# Patient Record
Sex: FEMALE | Race: ASIAN | NOT HISPANIC OR LATINO
[De-identification: names, ages, dates, MRNs, and addresses within clinical notes are randomized per-mention and may not be internally consistent; named-entity substitution may affect disease eponyms.]

---

## 2017-01-04 ENCOUNTER — APPOINTMENT (OUTPATIENT)
Dept: CARDIOLOGY | Facility: CLINIC | Age: 67
End: 2017-01-04
Payer: MEDICARE

## 2017-01-04 ENCOUNTER — NON-APPOINTMENT (OUTPATIENT)
Age: 67
End: 2017-01-04

## 2017-01-04 VITALS
WEIGHT: 108 LBS | HEART RATE: 82 BPM | BODY MASS INDEX: 19.14 KG/M2 | SYSTOLIC BLOOD PRESSURE: 126 MMHG | DIASTOLIC BLOOD PRESSURE: 76 MMHG | OXYGEN SATURATION: 95 % | RESPIRATION RATE: 18 BRPM | HEIGHT: 63 IN

## 2017-01-04 DIAGNOSIS — Z00.00 ENCOUNTER FOR GENERAL ADULT MEDICAL EXAMINATION W/OUT ABNORMAL FINDINGS: ICD-10-CM

## 2017-01-04 DIAGNOSIS — Z78.9 OTHER SPECIFIED HEALTH STATUS: ICD-10-CM

## 2017-01-04 PROCEDURE — 99215 OFFICE O/P EST HI 40 MIN: CPT

## 2017-01-04 PROCEDURE — 93000 ELECTROCARDIOGRAM COMPLETE: CPT

## 2017-01-09 ENCOUNTER — APPOINTMENT (OUTPATIENT)
Dept: CARDIOLOGY | Facility: CLINIC | Age: 67
End: 2017-01-09

## 2017-01-09 ENCOUNTER — FORM ENCOUNTER (OUTPATIENT)
Age: 67
End: 2017-01-09

## 2017-01-10 ENCOUNTER — OUTPATIENT (OUTPATIENT)
Dept: OUTPATIENT SERVICES | Facility: HOSPITAL | Age: 67
LOS: 1 days | End: 2017-01-10
Payer: MEDICARE

## 2017-01-10 ENCOUNTER — APPOINTMENT (OUTPATIENT)
Dept: ULTRASOUND IMAGING | Facility: HOSPITAL | Age: 67
End: 2017-01-10

## 2017-01-10 DIAGNOSIS — I10 ESSENTIAL (PRIMARY) HYPERTENSION: ICD-10-CM

## 2017-01-10 DIAGNOSIS — Z00.00 ENCOUNTER FOR GENERAL ADULT MEDICAL EXAMINATION WITHOUT ABNORMAL FINDINGS: ICD-10-CM

## 2017-01-10 DIAGNOSIS — E78.5 HYPERLIPIDEMIA, UNSPECIFIED: ICD-10-CM

## 2017-01-10 DIAGNOSIS — I25.10 ATHEROSCLEROTIC HEART DISEASE OF NATIVE CORONARY ARTERY WITHOUT ANGINA PECTORIS: ICD-10-CM

## 2017-01-10 PROCEDURE — 93880 EXTRACRANIAL BILAT STUDY: CPT

## 2017-03-06 ENCOUNTER — RX RENEWAL (OUTPATIENT)
Age: 67
End: 2017-03-06

## 2017-09-06 ENCOUNTER — APPOINTMENT (OUTPATIENT)
Dept: OTOLARYNGOLOGY | Facility: CLINIC | Age: 67
End: 2017-09-06
Payer: MEDICARE

## 2017-09-06 VITALS
HEART RATE: 62 BPM | SYSTOLIC BLOOD PRESSURE: 112 MMHG | HEIGHT: 63 IN | DIASTOLIC BLOOD PRESSURE: 72 MMHG | WEIGHT: 107 LBS | BODY MASS INDEX: 18.96 KG/M2

## 2017-09-06 DIAGNOSIS — H93.11 TINNITUS, RIGHT EAR: ICD-10-CM

## 2017-09-06 PROCEDURE — 92567 TYMPANOMETRY: CPT

## 2017-09-06 PROCEDURE — 92557 COMPREHENSIVE HEARING TEST: CPT

## 2017-09-06 PROCEDURE — 31231 NASAL ENDOSCOPY DX: CPT

## 2017-09-06 PROCEDURE — 99204 OFFICE O/P NEW MOD 45 MIN: CPT | Mod: 25

## 2017-09-06 RX ORDER — AMOXICILLIN 500 MG/1
500 CAPSULE ORAL
Qty: 28 | Refills: 0 | Status: DISCONTINUED | COMMUNITY
Start: 2017-04-27

## 2017-09-06 RX ORDER — CLOTRIMAZOLE AND BETAMETHASONE DIPROPIONATE 10; .5 MG/G; MG/G
1-0.05 CREAM TOPICAL
Qty: 15 | Refills: 0 | Status: ACTIVE | COMMUNITY
Start: 2017-08-22

## 2017-09-06 RX ORDER — GLUCOSAMINE SULFATE 500 MG
CAPSULE ORAL
Refills: 0 | Status: ACTIVE | COMMUNITY

## 2017-09-11 ENCOUNTER — FORM ENCOUNTER (OUTPATIENT)
Age: 67
End: 2017-09-11

## 2017-09-12 ENCOUNTER — OUTPATIENT (OUTPATIENT)
Dept: OUTPATIENT SERVICES | Facility: HOSPITAL | Age: 67
LOS: 1 days | End: 2017-09-12
Payer: MEDICARE

## 2017-09-12 ENCOUNTER — APPOINTMENT (OUTPATIENT)
Dept: MRI IMAGING | Facility: CLINIC | Age: 67
End: 2017-09-12
Payer: MEDICARE

## 2017-09-12 DIAGNOSIS — Z00.8 ENCOUNTER FOR OTHER GENERAL EXAMINATION: ICD-10-CM

## 2017-09-12 PROCEDURE — 70553 MRI BRAIN STEM W/O & W/DYE: CPT | Mod: 26

## 2017-09-12 PROCEDURE — A9585: CPT

## 2017-09-12 PROCEDURE — 70553 MRI BRAIN STEM W/O & W/DYE: CPT

## 2017-09-12 PROCEDURE — 82565 ASSAY OF CREATININE: CPT

## 2018-09-26 ENCOUNTER — RESULT REVIEW (OUTPATIENT)
Age: 68
End: 2018-09-26

## 2019-03-25 ENCOUNTER — APPOINTMENT (OUTPATIENT)
Dept: ORTHOPEDIC SURGERY | Facility: CLINIC | Age: 69
End: 2019-03-25
Payer: MEDICARE

## 2019-03-25 DIAGNOSIS — M75.41 IMPINGEMENT SYNDROME OF RIGHT SHOULDER: ICD-10-CM

## 2019-03-25 PROCEDURE — 99203 OFFICE O/P NEW LOW 30 MIN: CPT

## 2019-03-25 PROCEDURE — 73030 X-RAY EXAM OF SHOULDER: CPT | Mod: RT

## 2019-03-25 NOTE — END OF VISIT
[FreeTextEntry3] : All medical record entries made by the Maranda[facility] were at my, Dr. Roman Castillo, direction and personally dictated by me on 03/25/2019. I have reviewed the chart and agree that the record accurately reflects my personal performance of the history, physical exam, assessment and plan. I have also personally directed, reviewed, and agreed with the chart.

## 2019-03-25 NOTE — ADDENDUM
[FreeTextEntry1] : I, Vanessa Benavides, acted solely as a scribe for Dr. Roman Castillo on this date 03/25/2019.

## 2019-03-25 NOTE — CONSULT LETTER
[Dear  ___] : Dear  [unfilled], [Consult Letter:] : I had the pleasure of evaluating your patient, [unfilled]. [Please see my note below.] : Please see my note below. [Consult Closing:] : Thank you very much for allowing me to participate in the care of this patient.  If you have any questions, please do not hesitate to contact me. [Sincerely,] : Sincerely, [FreeTextEntry3] :  Dr. Roman Castillo

## 2019-03-25 NOTE — PHYSICAL EXAM
[Normal RUE] : Right Upper Extremity: No scars, rashes, lesions, ulcers, skin intact [Normal LUE] : Left Upper Extremity: No scars, rashes, lesions, ulcers, skin intact [Normal Touch] : sensation intact for touch [Normal] : No swelling, no edema, normal pedal pulses and normal temperature [Poor Appearance] : well-appearing [Acute Distress] : not in acute distress [Obese] : not obese [de-identified] : Right Upper Extremity\par o Shoulder :\par ¦ Inspection/Palpation : tenderness over the greater tuberosity, no acromioclavicular joint tenderness, mild swelling of the shoulder, no deformities\par ¦ Range of Motion : ACTIVE FORWARD ELEVATION: Measured at 140 degrees, ACTIVE EXTERNAL ROTATION: Measured at 60 degrees, ACTIVE INTERNAL ROTATION: Measured at T10 \par ¦ Strength : external rotation 5/5, internal rotation 5/5, supraspinatus 5/5 \par ¦ Stability : no joint instability on provocative testing\par ¦ Tests/Signs : Neer (-), Ramirez (+)\par o Upper Arm : no tenderness, no swelling, no deformities\par o Muscle Bulk : no atrophy \par o Sensation : sensation intact to light touch \par o Skin : no skin rash or discoloration \par o Vascular Exam : no edema, no cyanosis, radial and ulnar pulses normal \par  \par Left Upper Extremity\par o Shoulder : \par ¦ Inspection/Palpation : no tenderness, no swelling, no deformities\par ¦ Range of Motion : ACTIVE FORWARD ELEVATION: Measured at 150 degrees, ACTIVE EXTERNAL ROTATION: Measured at 65 degrees, ACTIVE INTERNAL ROTATION: Measured at T7\par ¦ Strength : external rotation 5/5, internal rotation 5/5, supraspinatus 5/5 \par ¦ Stability : no joint instability on provocative testing\par o Upper Arm : no tenderness, no swelling, no deformities\par o Muscle Bulk : no atrophy\par o Sensation : sensation intact to light touch\par o Skin : no skin rash or discoloration\par o Vascular Exam : no edema, no cyanosis, radial and ulnar pulses normal  [de-identified] : o  Right Shoulder : Internal/External rotation, and outlet views were obtained, there are no soft tissue abnormalities, no fractures, alignment is normal, normal appearing joint spaces, cystic changed to the greater tuberosity, normal bone density, no bony lesions.

## 2019-03-25 NOTE — DISCUSSION/SUMMARY
[de-identified] : The underlying pathophysiology was reviewed in great detail with the patient as well as the various treatment options, including ice, analgesics, NSAIDs, Physical therapy, steroid injections.\par \par A home exercise sheet was given and discussed with the patient to follow, a Thera-Band was provided.\par \par Activity modifications and restrictions were discussed, as well as, working on posture. \par \par If her pain does not improve we will consider an MRI of the right shoulder.\par \par FU 4-6 weeks.

## 2019-03-25 NOTE — HISTORY OF PRESENT ILLNESS
[de-identified] : 68 year old female presents for an evaluation of right shoulder pain that began in December 2018, she cannot attribute her pain to any specific injury or event. Today she is accompanied to the office by her  who use to work as an orthopedist, he is helping contribute to her medical history. She says that her pain has been progressively worsening over time and currently she rates her pain a 6/10 and describes it as a constant aching pain localized to the anterior aspect of her shoulder. Her pain is exacerbated with shoulder rotation, especially abduction and internal rotation. She reports that her pain has been waking her at night and she is unable to sleep on her right side. Her pain is alleviated with limiting use of her right arm, she has not been taking any medications for her pain at this time.

## 2020-10-09 ENCOUNTER — RESULT REVIEW (OUTPATIENT)
Age: 70
End: 2020-10-09

## 2022-10-13 ENCOUNTER — RESULT REVIEW (OUTPATIENT)
Age: 72
End: 2022-10-13

## 2023-04-11 ENCOUNTER — OFFICE (OUTPATIENT)
Dept: URBAN - METROPOLITAN AREA CLINIC 35 | Facility: CLINIC | Age: 73
Setting detail: OPHTHALMOLOGY
End: 2023-04-11
Payer: MEDICARE

## 2023-04-11 DIAGNOSIS — H25.13: ICD-10-CM

## 2023-04-11 DIAGNOSIS — H11.153: ICD-10-CM

## 2023-04-11 PROCEDURE — 99214 OFFICE O/P EST MOD 30 MIN: CPT | Performed by: OPHTHALMOLOGY

## 2023-04-11 ASSESSMENT — REFRACTION_CURRENTRX
OD_ADD: +1.50
OS_SPHERE: +0.25
OS_AXIS: 163
OD_VPRISM_DIRECTION: SV
OS_CYLINDER: +1.25
OS_OVR_VA: 20/
OD_VPRISM_DIRECTION: BF
OS_VPRISM_DIRECTION: BF
OS_SPHERE: -0.25
OS_SPHERE: -0.25
OD_OVR_VA: 20/
OS_OVR_VA: 20/
OD_OVR_VA: 20/
OS_ADD: +1.50
OD_SPHERE: -1.75
OD_CYLINDER: +1.25
OD_OVR_VA: 20/
OS_AXIS: 169
OD_CYLINDER: +0.75
OD_AXIS: 002
OD_AXIS: 005
OD_SPHERE: -1.75
OS_AXIS: 169
OD_SPHERE: +0.50
OS_OVR_VA: 20/
OD_AXIS: 011
OD_CYLINDER: +1.00
OS_CYLINDER: +1.25
OS_CYLINDER: +1.50

## 2023-04-11 ASSESSMENT — VISUAL ACUITY
OS_BCVA: 20/70
OD_BCVA: 20/50

## 2023-04-11 ASSESSMENT — REFRACTION_MANIFEST
OS_ADD: +2.50
OS_AXIS: 165
OS_CYLINDER: +1.50
OD_CYLINDER: +1.00
OS_VA1: 20/20
OS_ADD: +2.25
OD_ADD: +2.50
OS_ADD: +1.50
OS_CYLINDER: +0.50
OD_SPHERE: -1.50
OS_SPHERE: +0.25
OD_AXIS: 180
OS_SPHERE: PLANO
OD_SPHERE: -1.25
OS_AXIS: 170
OD_AXIS: 180
OS_CYLINDER: +1.50
OD_ADD: +2.25
OD_CYLINDER: +1.25
OD_VA1: 20/20
OD_CYLINDER: +1.25
OD_VA1: 20/30-
OS_AXIS: 170
OS_AXIS: 170
OD_ADD: +1.50
OS_ADD: +2.50
OS_SPHERE: PLANO
OD_AXIS: 180
OS_SPHERE: +0.50
OD_VA1: 20/20
OD_AXIS: 180
OS_CYLINDER: +1.50
OD_CYLINDER: +1.00
OD_SPHERE: -1.75
OD_VA1: 20/25
OD_SPHERE: -1.75
OD_ADD: +2.50
OS_VA1: 20/30-

## 2023-04-11 ASSESSMENT — REFRACTION_AUTOREFRACTION
OD_AXIS: 159
OD_CYLINDER: +1.25
OS_SPHERE: +1.25
OS_AXIS: 173
OS_CYLINDER: +1.25
OD_SPHERE: -1.25

## 2023-04-11 ASSESSMENT — AXIALLENGTH_DERIVED
OS_AL: 23.3372
OD_AL: 24.328
OS_AL: 23.4329
OD_AL: 24.1226
OD_AL: 24.1736
OD_AL: 24.2763
OS_AL: 23.1014
OD_AL: 24.328

## 2023-04-11 ASSESSMENT — SPHEQUIV_DERIVED
OD_SPHEQUIV: -0.625
OS_SPHEQUIV: 1.875
OD_SPHEQUIV: -1.125
OD_SPHEQUIV: -0.75
OS_SPHEQUIV: 1
OD_SPHEQUIV: -1.125
OS_SPHEQUIV: 1.25
OD_SPHEQUIV: -1

## 2023-04-11 ASSESSMENT — KERATOMETRY
OS_K2POWER_DIOPTERS: 43.25
METHOD_AUTO_MANUAL: AUTO
OS_K1POWER_DIOPTERS: 42.50
OD_K1POWER_DIOPTERS: 42.50
OD_AXISANGLE_DEGREES: 009
OS_AXISANGLE_DEGREES: 157
OD_K2POWER_DIOPTERS: 43.00

## 2023-04-11 ASSESSMENT — LID EXAM ASSESSMENTS
OD_COMMENTS: BLEPHAROCHALASIS WITH HOODING
OS_COMMENTS: BLEPHAROCHALASIS WITH HOODING

## 2023-04-15 ENCOUNTER — OFFICE (OUTPATIENT)
Dept: URBAN - METROPOLITAN AREA CLINIC 35 | Facility: CLINIC | Age: 73
Setting detail: OPHTHALMOLOGY
End: 2023-04-15
Payer: MEDICARE

## 2023-04-15 DIAGNOSIS — H35.033: ICD-10-CM

## 2023-04-15 DIAGNOSIS — H16.222: ICD-10-CM

## 2023-04-15 DIAGNOSIS — H35.40: ICD-10-CM

## 2023-04-15 DIAGNOSIS — H25.13: ICD-10-CM

## 2023-04-15 DIAGNOSIS — H01.001: ICD-10-CM

## 2023-04-15 DIAGNOSIS — H01.004: ICD-10-CM

## 2023-04-15 DIAGNOSIS — H40.013: ICD-10-CM

## 2023-04-15 DIAGNOSIS — H11.153: ICD-10-CM

## 2023-04-15 PROCEDURE — 92014 COMPRE OPH EXAM EST PT 1/>: CPT | Performed by: OPHTHALMOLOGY

## 2023-04-15 ASSESSMENT — REFRACTION_MANIFEST
OD_SPHERE: -1.75
OD_CYLINDER: +1.00
OD_SPHERE: -1.50
OS_CYLINDER: +1.50
OS_ADD: +2.25
OS_ADD: +2.50
OS_AXIS: 170
OD_ADD: +1.50
OD_VA1: 20/30-
OD_ADD: +2.50
OD_AXIS: 180
OD_SPHERE: -1.25
OS_ADD: +1.50
OS_CYLINDER: +1.00
OD_AXIS: 180
OS_AXIS: 165
OS_AXIS: 180
OD_VA1: 20/20
OD_AXIS: 180
OS_CYLINDER: +0.50
OS_SPHERE: +0.50
OS_CYLINDER: +1.50
OS_SPHERE: +0.75
OD_VA1: 20/30-2
OD_CYLINDER: +1.25
OD_CYLINDER: +1.25
OS_AXIS: 170
OS_VA1: 20/30-
OD_VA1: 20/25
OD_SPHERE: -1.50
OD_ADD: +2.25
OS_CYLINDER: +1.50
OS_SPHERE: PLANO
OD_CYLINDER: +1.25
OD_AXIS: 180
OS_SPHERE: PLANO
OS_AXIS: 170
OD_CYLINDER: +1.00
OS_VA1: 20/20
OD_SPHERE: -1.75
OS_ADD: +2.50
OS_SPHERE: +0.25
OD_VA1: 20/20
OS_VA1: 20/20-2
OD_ADD: +2.50
OD_AXIS: 160

## 2023-04-15 ASSESSMENT — KERATOMETRY
OD_K1POWER_DIOPTERS: 42.25
OD_AXISANGLE_DEGREES: 010
OD_K2POWER_DIOPTERS: 043.00
OS_K1POWER_DIOPTERS: 42.50
METHOD_AUTO_MANUAL: AUTO
OS_K2POWER_DIOPTERS: 43.25
OS_AXISANGLE_DEGREES: 158

## 2023-04-15 ASSESSMENT — AXIALLENGTH_DERIVED
OD_AL: 24.325
OS_AL: 23.3372
OD_AL: 24.2733
OS_AL: 23.4329
OS_AL: 23.3372
OS_AL: 23.1482
OD_AL: 24.3769
OD_AL: 24.3769
OD_AL: 24.2219
OD_AL: 24.2219

## 2023-04-15 ASSESSMENT — SPHEQUIV_DERIVED
OS_SPHEQUIV: 1.25
OD_SPHEQUIV: -1.125
OD_SPHEQUIV: -0.75
OD_SPHEQUIV: -0.75
OS_SPHEQUIV: 1.25
OS_SPHEQUIV: 1
OD_SPHEQUIV: -0.875
OD_SPHEQUIV: -1.125
OD_SPHEQUIV: -1
OS_SPHEQUIV: 1.75

## 2023-04-15 ASSESSMENT — REFRACTION_CURRENTRX
OD_AXIS: 011
OD_CYLINDER: +0.75
OS_AXIS: 169
OD_VPRISM_DIRECTION: BF
OD_SPHERE: +0.50
OS_AXIS: 169
OD_ADD: +1.50
OS_VPRISM_DIRECTION: BF
OS_ADD: +1.50
OD_OVR_VA: 20/
OD_CYLINDER: +1.25
OD_AXIS: 002
OS_SPHERE: +0.25
OS_CYLINDER: +1.25
OS_SPHERE: -0.25
OD_AXIS: 005
OD_SPHERE: -1.75
OS_SPHERE: -0.25
OS_OVR_VA: 20/
OS_OVR_VA: 20/
OD_VPRISM_DIRECTION: SV
OD_SPHERE: -1.75
OS_CYLINDER: +1.50
OD_OVR_VA: 20/
OD_OVR_VA: 20/
OD_CYLINDER: +1.00
OS_OVR_VA: 20/
OS_AXIS: 163
OS_CYLINDER: +1.25

## 2023-04-15 ASSESSMENT — LID EXAM ASSESSMENTS
OD_COMMENTS: BLEPHAROCHALASIS WITH HOODING
OS_BLEPHARITIS: LUL T
OS_COMMENTS: BLEPHAROCHALASIS WITH HOODING
OD_BLEPHARITIS: RUL T 1+

## 2023-04-15 ASSESSMENT — REFRACTION_AUTOREFRACTION
OS_CYLINDER: +1.00
OS_SPHERE: +1.25
OD_CYLINDER: +1.00
OD_SPHERE: -1.25
OS_AXIS: 177
OD_AXIS: 159

## 2023-04-15 ASSESSMENT — VISUAL ACUITY
OS_BCVA: 20/60
OD_BCVA: 20/40+1

## 2023-04-15 ASSESSMENT — CONFRONTATIONAL VISUAL FIELD TEST (CVF)
OD_FINDINGS: FULL
OS_FINDINGS: FULL

## 2023-04-15 ASSESSMENT — TONOMETRY
OS_IOP_MMHG: 18
OD_IOP_MMHG: 20

## 2023-04-15 ASSESSMENT — DRY EYES - PHYSICIAN NOTES: OS_GENERALCOMMENTS: TEAR FILM DEBRIS, SPK IN

## 2023-04-15 ASSESSMENT — SUPERFICIAL PUNCTATE KERATITIS (SPK): OS_SPK: 1+ 2+

## 2023-06-06 ENCOUNTER — OFFICE (OUTPATIENT)
Dept: URBAN - METROPOLITAN AREA CLINIC 34 | Facility: CLINIC | Age: 73
Setting detail: OPHTHALMOLOGY
End: 2023-06-06
Payer: MEDICARE

## 2023-06-06 DIAGNOSIS — H25.13: ICD-10-CM

## 2023-06-06 DIAGNOSIS — H25.11: ICD-10-CM

## 2023-06-06 PROCEDURE — 92136 OPHTHALMIC BIOMETRY: CPT | Performed by: OPHTHALMOLOGY

## 2023-06-06 PROCEDURE — 99213 OFFICE O/P EST LOW 20 MIN: CPT | Performed by: OPHTHALMOLOGY

## 2023-06-06 ASSESSMENT — REFRACTION_MANIFEST
OS_ADD: +2.50
OD_ADD: +2.50
OD_VA1: 20/25
OS_SPHERE: PLANO
OS_ADD: +2.25
OS_AXIS: 170
OD_CYLINDER: +1.00
OS_SPHERE: PLANO
OD_SPHERE: -1.25
OD_SPHERE: -1.75
OS_VA1: 20/30-
OD_CYLINDER: +1.25
OS_AXIS: 180
OS_CYLINDER: +1.50
OS_SPHERE: +0.75
OD_ADD: +1.50
OS_VA1: 20/20
OS_VA1: 20/20-2
OS_CYLINDER: +1.50
OD_SPHERE: -1.50
OD_CYLINDER: +1.25
OD_CYLINDER: +1.00
OD_SPHERE: -1.75
OS_SPHERE: +0.25
OS_AXIS: 165
OS_ADD: +2.50
OS_ADD: +1.50
OD_VA1: 20/30-
OD_ADD: +2.50
OD_AXIS: 160
OD_AXIS: 180
OD_SPHERE: -1.50
OD_ADD: +2.25
OS_AXIS: 170
OS_CYLINDER: +0.50
OD_VA1: 20/20
OS_SPHERE: +0.50
OS_CYLINDER: +1.00
OD_AXIS: 180
OS_AXIS: 170
OD_VA1: 20/20
OD_CYLINDER: +1.25
OD_AXIS: 180
OS_CYLINDER: +1.50
OD_AXIS: 180
OD_VA1: 20/30-2

## 2023-06-06 ASSESSMENT — CONFRONTATIONAL VISUAL FIELD TEST (CVF)
OS_FINDINGS: FULL
OD_FINDINGS: FULL

## 2023-06-06 ASSESSMENT — AXIALLENGTH_DERIVED
OS_AL: 23.3427
OD_AL: 24.0327
OD_AL: 23.9823
OD_AL: 24.0834
OD_AL: 24.1342
OS_AL: 23.2477
OS_AL: 23.2477
OD_AL: 24.0327
OD_AL: 24.1342
OS_AL: 23.0601

## 2023-06-06 ASSESSMENT — REFRACTION_AUTOREFRACTION
OD_SPHERE: -1.50
OD_AXIS: 161
OS_CYLINDER: +1.50
OD_CYLINDER: +1.25
OS_SPHERE: +1.00
OS_AXIS: 176

## 2023-06-06 ASSESSMENT — REFRACTION_CURRENTRX
OD_SPHERE: -1.75
OS_AXIS: 169
OD_VPRISM_DIRECTION: SV
OD_VPRISM_DIRECTION: BF
OD_AXIS: 005
OS_SPHERE: +0.25
OD_OVR_VA: 20/
OS_SPHERE: -0.25
OD_CYLINDER: +0.75
OS_OVR_VA: 20/
OS_SPHERE: -0.25
OD_AXIS: 011
OS_CYLINDER: +1.25
OD_OVR_VA: 20/
OD_CYLINDER: +1.25
OS_AXIS: 169
OD_OVR_VA: 20/
OD_CYLINDER: +1.00
OD_SPHERE: -1.75
OD_SPHERE: +0.50
OD_ADD: +1.50
OS_AXIS: 163
OS_CYLINDER: +1.25
OS_OVR_VA: 20/
OD_AXIS: 002
OS_VPRISM_DIRECTION: BF
OS_OVR_VA: 20/
OS_ADD: +1.50
OS_CYLINDER: +1.50

## 2023-06-06 ASSESSMENT — VISUAL ACUITY
OS_BCVA: 20/60
OD_BCVA: 20/40+1

## 2023-06-06 ASSESSMENT — SPHEQUIV_DERIVED
OD_SPHEQUIV: -1.125
OS_SPHEQUIV: 1.25
OD_SPHEQUIV: -0.75
OD_SPHEQUIV: -0.875
OD_SPHEQUIV: -0.875
OD_SPHEQUIV: -1
OD_SPHEQUIV: -1.125
OS_SPHEQUIV: 1
OS_SPHEQUIV: 1.25
OS_SPHEQUIV: 1.75

## 2023-06-06 ASSESSMENT — KERATOMETRY
OD_AXISANGLE_DEGREES: 090
OS_K2POWER_DIOPTERS: 43.50
OD_K2POWER_DIOPTERS: 43.25
OS_K1POWER_DIOPTERS: 42.75
OD_K1POWER_DIOPTERS: 43.25
OS_AXISANGLE_DEGREES: 159
METHOD_AUTO_MANUAL: AUTO

## 2023-06-06 ASSESSMENT — DRY EYES - PHYSICIAN NOTES: OS_GENERALCOMMENTS: TEAR FILM DEBRIS, SPK IN

## 2023-06-06 ASSESSMENT — SUPERFICIAL PUNCTATE KERATITIS (SPK): OS_SPK: 1+ 2+

## 2023-06-15 ENCOUNTER — ASC (OUTPATIENT)
Dept: URBAN - METROPOLITAN AREA SURGERY 8 | Facility: SURGERY | Age: 73
Setting detail: OPHTHALMOLOGY
End: 2023-06-15
Payer: MEDICARE

## 2023-06-15 DIAGNOSIS — H25.11: ICD-10-CM

## 2023-06-15 DIAGNOSIS — H52.211: ICD-10-CM

## 2023-06-15 PROCEDURE — 66984 XCAPSL CTRC RMVL W/O ECP: CPT | Performed by: OPHTHALMOLOGY

## 2023-06-15 PROCEDURE — FEMTO PRECISION LASER CATARACT SURGERY: Performed by: OPHTHALMOLOGY

## 2023-06-15 PROCEDURE — A9270 NON-COVERED ITEM OR SERVICE: HCPCS | Performed by: OPHTHALMOLOGY

## 2023-06-16 ENCOUNTER — OFFICE (OUTPATIENT)
Dept: URBAN - METROPOLITAN AREA CLINIC 35 | Facility: CLINIC | Age: 73
Setting detail: OPHTHALMOLOGY
End: 2023-06-16
Payer: MEDICARE

## 2023-06-16 DIAGNOSIS — Z96.1: ICD-10-CM

## 2023-06-16 PROBLEM — H11.151 PINGUECULA ;  , RIGHT EYE: Status: ACTIVE | Noted: 2023-06-16

## 2023-06-16 PROBLEM — H25.12 CATARACT SENILE NUCLEAR SCLEROSIS; LEFT EYE: Status: ACTIVE | Noted: 2023-06-06

## 2023-06-16 PROCEDURE — 99024 POSTOP FOLLOW-UP VISIT: CPT | Performed by: OPHTHALMOLOGY

## 2023-06-16 ASSESSMENT — REFRACTION_CURRENTRX
OD_AXIS: 002
OD_AXIS: 005
OS_AXIS: 169
OD_OVR_VA: 20/
OS_SPHERE: -0.25
OS_CYLINDER: +1.25
OD_SPHERE: -1.75
OD_CYLINDER: +0.75
OS_CYLINDER: +1.50
OS_SPHERE: -0.25
OD_AXIS: 011
OS_AXIS: 163
OD_VPRISM_DIRECTION: BF
OD_OVR_VA: 20/
OD_VPRISM_DIRECTION: SV
OD_CYLINDER: +1.25
OD_ADD: +1.50
OS_OVR_VA: 20/
OD_SPHERE: -1.75
OD_CYLINDER: +1.00
OD_OVR_VA: 20/
OS_OVR_VA: 20/
OS_VPRISM_DIRECTION: BF
OS_OVR_VA: 20/
OS_SPHERE: +0.25
OS_CYLINDER: +1.25
OS_ADD: +1.50
OS_AXIS: 169
OD_SPHERE: +0.50

## 2023-06-16 ASSESSMENT — REFRACTION_MANIFEST
OD_ADD: +1.50
OD_ADD: +2.50
OD_CYLINDER: +1.00
OD_VA1: 20/20
OS_ADD: +2.50
OS_SPHERE: +0.50
OD_ADD: +2.25
OD_VA1: 20/20
OD_CYLINDER: +1.00
OS_VA1: 20/30-
OD_AXIS: 180
OS_SPHERE: PLANO
OS_CYLINDER: +0.50
OS_CYLINDER: +1.50
OS_SPHERE: +0.75
OS_AXIS: 180
OD_VA1: 20/30-2
OS_ADD: +2.50
OD_VA1: 20/30-
OD_CYLINDER: +1.25
OD_SPHERE: -1.50
OS_AXIS: 170
OS_ADD: +2.25
OD_AXIS: 180
OS_AXIS: 170
OD_AXIS: 180
OD_AXIS: 180
OD_SPHERE: -1.75
OD_ADD: +2.50
OD_SPHERE: -1.50
OS_CYLINDER: +1.50
OS_CYLINDER: +1.50
OD_AXIS: 160
OD_SPHERE: -1.75
OS_AXIS: 170
OS_ADD: +1.50
OS_SPHERE: PLANO
OS_AXIS: 165
OD_CYLINDER: +1.25
OS_SPHERE: +0.25
OD_VA1: 20/25
OS_VA1: 20/20
OD_SPHERE: -1.25
OS_CYLINDER: +1.00
OS_VA1: 20/20-2
OD_CYLINDER: +1.25

## 2023-06-16 ASSESSMENT — AXIALLENGTH_DERIVED
OS_AL: 23.3372
OD_AL: 24.325
OD_AL: 24.2733
OD_AL: 24.2219
OS_AL: 23.1014
OS_AL: 23.3372
OD_AL: 24.3769
OD_AL: 24.3769
OS_AL: 23.4329
OD_AL: 24.2733

## 2023-06-16 ASSESSMENT — KERATOMETRY
OD_K2POWER_DIOPTERS: 43.75
OD_AXISANGLE_DEGREES: 076
OD_K1POWER_DIOPTERS: 41.50
OS_K2POWER_DIOPTERS: 43.25
METHOD_AUTO_MANUAL: AUTO
OS_AXISANGLE_DEGREES: 158
OS_K1POWER_DIOPTERS: 42.50

## 2023-06-16 ASSESSMENT — CONFRONTATIONAL VISUAL FIELD TEST (CVF)
OD_FINDINGS: FULL
OS_FINDINGS: FULL

## 2023-06-16 ASSESSMENT — TONOMETRY: OD_IOP_MMHG: 18

## 2023-06-16 ASSESSMENT — REFRACTION_AUTOREFRACTION
OS_CYLINDER: -1.25
OD_AXIS: 006
OD_CYLINDER: -1.25
OS_AXIS: 086
OS_SPHERE: +2.50
OD_SPHERE: -0.25

## 2023-06-16 ASSESSMENT — VISUAL ACUITY
OS_BCVA: 20/25-1
OD_BCVA: 20/40+1

## 2023-06-16 ASSESSMENT — SPHEQUIV_DERIVED
OD_SPHEQUIV: -1.125
OS_SPHEQUIV: 1.25
OD_SPHEQUIV: -0.75
OD_SPHEQUIV: -0.875
OS_SPHEQUIV: 1.875
OS_SPHEQUIV: 1
OD_SPHEQUIV: -0.875
OS_SPHEQUIV: 1.25
OD_SPHEQUIV: -1.125
OD_SPHEQUIV: -1

## 2023-06-16 ASSESSMENT — SUPERFICIAL PUNCTATE KERATITIS (SPK): OS_SPK: 1+ 2+

## 2023-06-16 ASSESSMENT — DRY EYES - PHYSICIAN NOTES: OS_GENERALCOMMENTS: TEAR FILM DEBRIS, SPK IN

## 2023-07-19 ENCOUNTER — OFFICE (OUTPATIENT)
Dept: URBAN - METROPOLITAN AREA CLINIC 34 | Facility: CLINIC | Age: 73
Setting detail: OPHTHALMOLOGY
End: 2023-07-19
Payer: MEDICARE

## 2023-07-19 DIAGNOSIS — Z96.1: ICD-10-CM

## 2023-07-19 DIAGNOSIS — H00.022: ICD-10-CM
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
## 2023-07-19 DIAGNOSIS — H40.013: ICD-10-CM

## 2023-07-19 PROCEDURE — BRUDER EYE BRUDER EYE PADS: Performed by: OPHTHALMOLOGY

## 2023-07-19 PROCEDURE — 99024 POSTOP FOLLOW-UP VISIT: CPT | Performed by: OPHTHALMOLOGY

## 2023-07-19 ASSESSMENT — AXIALLENGTH_DERIVED
OS_AL: 23.0137
OD_AL: 24.2733
OS_AL: 23.2477
OD_AL: 24.3769
OS_AL: 23.2477
OD_AL: 24.3769
OD_AL: 24.325
OS_AL: 23.3427
OD_AL: 24.2219
OD_AL: 24.0689

## 2023-07-19 ASSESSMENT — REFRACTION_MANIFEST
OD_CYLINDER: +1.25
OD_SPHERE: -1.75
OS_ADD: +2.50
OS_SPHERE: +0.50
OD_CYLINDER: +1.00
OD_VA1: 20/30-
OD_ADD: +2.25
OS_AXIS: 170
OS_CYLINDER: +1.50
OD_VA1: 20/30-2
OD_SPHERE: -1.25
OS_CYLINDER: +1.50
OD_CYLINDER: +1.25
OD_SPHERE: -1.50
OS_SPHERE: PLANO
OD_SPHERE: -1.50
OS_AXIS: 170
OS_AXIS: 170
OS_VA1: 20/20
OS_CYLINDER: +0.50
OS_CYLINDER: +1.50
OD_SPHERE: -1.75
OD_AXIS: 180
OD_AXIS: 180
OS_AXIS: 165
OS_SPHERE: +0.75
OD_AXIS: 180
OD_VA1: 20/25
OS_SPHERE: +0.25
OS_ADD: +2.25
OS_VA1: 20/30-
OS_CYLINDER: +1.00
OD_ADD: +2.50
OD_AXIS: 160
OS_ADD: +1.50
OD_AXIS: 180
OD_ADD: +2.50
OD_VA1: 20/20
OD_CYLINDER: +1.00
OS_SPHERE: PLANO
OD_CYLINDER: +1.25
OD_ADD: +1.50
OS_ADD: +2.50
OD_VA1: 20/20
OS_VA1: 20/20-2
OS_AXIS: 180

## 2023-07-19 ASSESSMENT — KERATOMETRY
OS_AXISANGLE_DEGREES: 159
OS_K2POWER_DIOPTERS: 43.50
OD_AXISANGLE_DEGREES: 084
OS_K1POWER_DIOPTERS: 42.75
OD_K1POWER_DIOPTERS: 42.25
METHOD_AUTO_MANUAL: AUTO
OD_K2POWER_DIOPTERS: 43.00

## 2023-07-19 ASSESSMENT — VISUAL ACUITY
OS_BCVA: 20/20
OD_BCVA: 20/40

## 2023-07-19 ASSESSMENT — REFRACTION_CURRENTRX
OD_AXIS: 005
OD_SPHERE: -1.75
OD_CYLINDER: +1.00
OS_VPRISM_DIRECTION: BF
OS_CYLINDER: +1.50
OD_VPRISM_DIRECTION: SV
OD_ADD: +1.50
OS_SPHERE: +0.25
OS_OVR_VA: 20/
OS_CYLINDER: +1.25
OD_SPHERE: +0.50
OS_AXIS: 163
OD_SPHERE: -1.75
OS_OVR_VA: 20/
OS_SPHERE: -0.25
OD_OVR_VA: 20/
OS_AXIS: 169
OS_CYLINDER: +1.25
OD_OVR_VA: 20/
OS_OVR_VA: 20/
OS_ADD: +1.50
OD_VPRISM_DIRECTION: BF
OS_AXIS: 169
OD_CYLINDER: +0.75
OD_OVR_VA: 20/
OS_SPHERE: -0.25
OD_CYLINDER: +1.25
OD_AXIS: 002
OD_AXIS: 011

## 2023-07-19 ASSESSMENT — REFRACTION_AUTOREFRACTION
OD_AXIS: 111
OD_SPHERE: -0.50
OS_SPHERE: +1.25
OS_CYLINDER: +1.25
OS_AXIS: 172
OD_CYLINDER: +0.25

## 2023-07-19 ASSESSMENT — SPHEQUIV_DERIVED
OS_SPHEQUIV: 1.875
OD_SPHEQUIV: -1.125
OD_SPHEQUIV: -1.125
OD_SPHEQUIV: -0.75
OD_SPHEQUIV: -0.875
OS_SPHEQUIV: 1.25
OS_SPHEQUIV: 1
OS_SPHEQUIV: 1.25
OD_SPHEQUIV: -1
OD_SPHEQUIV: -0.375

## 2023-07-19 ASSESSMENT — DRY EYES - PHYSICIAN NOTES: OS_GENERALCOMMENTS: TEAR FILM DEBRIS, SPK IN

## 2023-07-19 ASSESSMENT — CONFRONTATIONAL VISUAL FIELD TEST (CVF)
OD_FINDINGS: FULL
OS_FINDINGS: FULL

## 2023-07-19 ASSESSMENT — LID EXAM ASSESSMENTS
OD_MEIBOMITIS: RLL 1+ 2+
OS_COMMENTS: BLEPHAROCHALASIS WITH HOODING
OD_BLEPHARITIS: RUL T 1+
OS_BLEPHARITIS: LUL T

## 2023-07-19 ASSESSMENT — TONOMETRY: OD_IOP_MMHG: 20

## 2023-07-19 ASSESSMENT — SUPERFICIAL PUNCTATE KERATITIS (SPK)
OS_SPK: 1+ 2+
OD_SPK: ABSENT

## 2023-09-20 ENCOUNTER — OFFICE (OUTPATIENT)
Dept: URBAN - METROPOLITAN AREA CLINIC 35 | Facility: CLINIC | Age: 73
Setting detail: OPHTHALMOLOGY
End: 2023-09-20
Payer: MEDICARE

## 2023-09-20 DIAGNOSIS — H40.013: ICD-10-CM

## 2023-09-20 DIAGNOSIS — H00.022: ICD-10-CM

## 2023-09-20 PROBLEM — H11.153 PINGUECULA ;  ,, BOTH EYES: Status: ACTIVE | Noted: 2023-09-20

## 2023-09-20 PROCEDURE — 99213 OFFICE O/P EST LOW 20 MIN: CPT | Performed by: OPHTHALMOLOGY

## 2023-09-20 ASSESSMENT — SPHEQUIV_DERIVED
OD_SPHEQUIV: -0.875
OS_SPHEQUIV: 2
OS_SPHEQUIV: 1.25
OD_SPHEQUIV: -1
OD_SPHEQUIV: -0.625
OD_SPHEQUIV: -0.75
OD_SPHEQUIV: -1.125
OS_SPHEQUIV: 1.625
OS_SPHEQUIV: 1
OS_SPHEQUIV: 1.25
OD_SPHEQUIV: -1.125

## 2023-09-20 ASSESSMENT — REFRACTION_MANIFEST
OD_CYLINDER: +1.00
OD_SPHERE: -1.75
OS_ADD: +2.50
OS_VA1: 20/20
OD_CYLINDER: +1.25
OD_AXIS: 180
OD_SPHERE: -0.25
OS_AXIS: 170
OS_AXIS: 170
OD_ADD: +1.50
OD_ADD: +2.25
OD_AXIS: 180
OD_CYLINDER: SPH
OS_SPHERE: +0.75
OS_CYLINDER: +0.50
OS_VA1: 20/20-2
OS_ADD: +2.50
OS_AXIS: 170
OD_VA1: 20/20
OD_SPHERE: -1.25
OS_CYLINDER: +1.25
OD_VA1: 20/30-2
OD_SPHERE: -1.75
OD_CYLINDER: +1.00
OS_CYLINDER: +1.50
OS_SPHERE: +0.50
OS_SPHERE: PLANO
OS_AXIS: 180
OD_CYLINDER: +1.25
OS_AXIS: 165
OD_ADD: +2.50
OS_SPHERE: +1.00
OS_ADD: +2.25
OS_SPHERE: PLANO
OD_SPHERE: -1.50
OS_ADD: +2.50
OS_CYLINDER: +1.50
OS_SPHERE: +0.25
OS_CYLINDER: +1.50
OD_ADD: +2.50
OD_AXIS: 180
OD_SPHERE: -1.50
OS_AXIS: 180
OD_VA1: 20/30-
OS_ADD: +1.50
OD_VA1: 20/25
OS_VA1: 20/30-
OD_AXIS: 180
OD_CYLINDER: +1.25
OD_ADD: +2.50
OD_VA1: 20/20
OD_AXIS: 160
OS_CYLINDER: +1.00

## 2023-09-20 ASSESSMENT — REFRACTION_AUTOREFRACTION
OS_CYLINDER: +1.50
OD_CYLINDER: +0.25
OD_SPHERE: -0.75
OS_SPHERE: +1.25
OD_AXIS: 68
OS_AXIS: 176

## 2023-09-20 ASSESSMENT — REFRACTION_CURRENTRX
OS_AXIS: 006
OS_SPHERE: -0.25
OD_SPHERE: -1.75
OD_AXIS: 005
OD_VPRISM_DIRECTION: SV
OD_OVR_VA: 20/
OD_AXIS: 002
OD_OVR_VA: 20/
OS_OVR_VA: 20/
OD_CYLINDER: +1.25
OS_SPHERE: +1.00
OS_CYLINDER: +1.25
OS_CYLINDER: +1.50
OD_SPHERE: +0.50
OD_AXIS: 004
OD_CYLINDER: +1.00
OS_SPHERE: +0.25
OD_OVR_VA: 20/
OS_CYLINDER: +1.00
OS_OVR_VA: 20/
OS_VPRISM_DIRECTION: SV
OD_CYLINDER: +1.00
OS_AXIS: 169
OS_OVR_VA: 20/
OD_SPHERE: -0.50
OD_VPRISM_DIRECTION: SV
OS_AXIS: 163

## 2023-09-20 ASSESSMENT — AXIALLENGTH_DERIVED
OD_AL: 24.5248
OD_AL: 24.5248
OS_AL: 23.1952
OS_AL: 23.3372
OS_AL: 23.3372
OD_AL: 24.42
OS_AL: 23.0548
OD_AL: 24.368
OS_AL: 23.4329
OD_AL: 24.3161
OD_AL: 24.4723

## 2023-09-20 ASSESSMENT — VISUAL ACUITY
OD_BCVA: 20/50
OS_BCVA: 20/20

## 2023-09-20 ASSESSMENT — KERATOMETRY
OD_AXISANGLE_DEGREES: 87
OD_K2POWER_DIOPTERS: 42.50
OS_AXISANGLE_DEGREES: 157
OS_K2POWER_DIOPTERS: 43.25
METHOD_AUTO_MANUAL: AUTO
OS_K1POWER_DIOPTERS: 42.50
OD_K1POWER_DIOPTERS: 42.00

## 2023-09-20 ASSESSMENT — LID EXAM ASSESSMENTS
OS_COMMENTS: BLEPHAROCHALASIS WITH HOODING
OS_BLEPHARITIS: LUL T
OD_MEIBOMITIS: RLL 1+ 2+
OD_BLEPHARITIS: RUL T 1+

## 2023-09-20 ASSESSMENT — CONFRONTATIONAL VISUAL FIELD TEST (CVF)
OD_FINDINGS: FULL
OS_FINDINGS: FULL

## 2023-09-20 ASSESSMENT — DRY EYES - PHYSICIAN NOTES: OS_GENERALCOMMENTS: TEAR FILM DEBRIS, SPK IN

## 2023-09-20 ASSESSMENT — SUPERFICIAL PUNCTATE KERATITIS (SPK)
OD_SPK: ABSENT
OS_SPK: 1+ 2+

## 2023-09-20 ASSESSMENT — TONOMETRY
OD_IOP_MMHG: 12
OS_IOP_MMHG: 14

## 2023-10-25 ENCOUNTER — OFFICE (OUTPATIENT)
Dept: URBAN - METROPOLITAN AREA CLINIC 35 | Facility: CLINIC | Age: 73
Setting detail: OPHTHALMOLOGY
End: 2023-10-25
Payer: MEDICARE

## 2023-10-25 DIAGNOSIS — H52.7: ICD-10-CM

## 2023-10-25 PROCEDURE — RX/CHECK RX/CHECK: Performed by: OPHTHALMOLOGY

## 2023-10-25 ASSESSMENT — REFRACTION_AUTOREFRACTION
OS_AXIS: 169
OS_SPHERE: +1.00
OD_SPHERE: -1.00
OS_CYLINDER: +1.50
OD_CYLINDER: +0.25
OD_AXIS: 082

## 2023-10-25 ASSESSMENT — REFRACTION_MANIFEST
OD_CYLINDER: +1.25
OD_CYLINDER: +1.25
OS_SPHERE: +0.75
OS_AXIS: 170
OD_VA1: 20/30-
OS_CYLINDER: +1.50
OS_AXIS: 170
OS_AXIS: 170
OD_SPHERE: -1.50
OD_SPHERE: -1.25
OS_CYLINDER: +1.50
OS_ADD: +1.50
OS_ADD: +2.50
OS_ADD: +2.50
OS_ADD: +2.25
OD_SPHERE: -0.25
OD_ADD: +2.50
OS_AXIS: 180
OD_VA1: 20/30-2
OS_SPHERE: +1.00
OS_SPHERE: +0.50
OS_CYLINDER: +1.00
OS_VA1: 20/20-2
OS_AXIS: 165
OD_ADD: +2.50
OS_ADD: +2.50
OS_SPHERE: PLANO
OD_SPHERE: -1.75
OD_VA1: 20/20
OD_AXIS: 180
OD_CYLINDER: +1.00
OD_CYLINDER: +1.25
OS_AXIS: 180
OD_SPHERE: -1.75
OD_VA1: 20/20
OD_AXIS: 180
OD_ADD: +1.50
OD_ADD: +2.50
OD_AXIS: 180
OS_SPHERE: PLANO
OD_VA1: 20/25
OD_AXIS: 160
OD_SPHERE: -1.50
OD_CYLINDER: +1.00
OS_SPHERE: +0.25
OS_CYLINDER: +1.25
OS_VA1: 20/30-
OD_AXIS: 180
OS_CYLINDER: +0.50
OS_VA1: 20/20
OS_CYLINDER: +1.50
OD_ADD: +2.25
OD_CYLINDER: SPH

## 2023-10-25 ASSESSMENT — SPHEQUIV_DERIVED
OD_SPHEQUIV: -0.875
OD_SPHEQUIV: -1.125
OS_SPHEQUIV: 1
OS_SPHEQUIV: 1.25
OS_SPHEQUIV: 1.25
OD_SPHEQUIV: -1.125
OD_SPHEQUIV: -0.875
OS_SPHEQUIV: 1.75
OD_SPHEQUIV: -1
OS_SPHEQUIV: 1.625
OD_SPHEQUIV: -0.75

## 2023-10-25 ASSESSMENT — REFRACTION_CURRENTRX
OD_CYLINDER: +1.00
OS_OVR_VA: 20/
OS_ADD: +2.50
OS_VPRISM_DIRECTION: PROGS
OD_CYLINDER: +1.25
OS_OVR_VA: 20/
OD_OVR_VA: 20/
OS_AXIS: 175
OS_AXIS: 163
OS_SPHERE: +1.00
OD_CYLINDER: +0.25
OS_SPHERE: -0.25
OD_AXIS: 002
OD_OVR_VA: 20/
OD_SPHERE: -0.25
OD_AXIS: 065
OD_SPHERE: +0.50
OD_ADD: +2.50
OD_AXIS: 005
OD_VPRISM_DIRECTION: SV
OS_CYLINDER: +1.25
OD_OVR_VA: 20/
OS_CYLINDER: +1.25
OD_SPHERE: -1.75
OS_CYLINDER: +1.50
OD_VPRISM_DIRECTION: PROGS
OS_SPHERE: +0.25
OS_AXIS: 169
OS_OVR_VA: 20/

## 2023-10-25 ASSESSMENT — KERATOMETRY
OD_K2POWER_DIOPTERS: 42.50
OS_K1POWER_DIOPTERS: 42.50
OS_K2POWER_DIOPTERS: 43.25
OS_AXISANGLE_DEGREES: 157
METHOD_AUTO_MANUAL: AUTO
OD_K1POWER_DIOPTERS: 42.00
OD_AXISANGLE_DEGREES: 87

## 2023-10-25 ASSESSMENT — LID EXAM ASSESSMENTS
OD_MEIBOMITIS: RLL 1+ 2+
OD_BLEPHARITIS: RUL T 1+
OS_BLEPHARITIS: LUL T
OS_COMMENTS: BLEPHAROCHALASIS WITH HOODING

## 2023-10-25 ASSESSMENT — AXIALLENGTH_DERIVED
OS_AL: 23.3372
OD_AL: 24.4723
OS_AL: 23.3372
OD_AL: 24.42
OD_AL: 24.5248
OD_AL: 24.42
OD_AL: 24.5248
OS_AL: 23.1952
OS_AL: 23.4329
OS_AL: 23.1482
OD_AL: 24.368

## 2023-10-25 ASSESSMENT — SUPERFICIAL PUNCTATE KERATITIS (SPK)
OD_SPK: ABSENT
OS_SPK: 1+ 2+

## 2023-10-25 ASSESSMENT — DRY EYES - PHYSICIAN NOTES: OS_GENERALCOMMENTS: TEAR FILM DEBRIS, SPK IN

## 2023-10-25 ASSESSMENT — CONFRONTATIONAL VISUAL FIELD TEST (CVF)
OD_FINDINGS: FULL
OS_FINDINGS: FULL

## 2023-10-25 ASSESSMENT — VISUAL ACUITY
OD_BCVA: 20/25+
OS_BCVA: 20/20-2

## 2023-11-03 ENCOUNTER — APPOINTMENT (OUTPATIENT)
Dept: CARDIOLOGY | Facility: CLINIC | Age: 73
End: 2023-11-03
Payer: MEDICARE

## 2023-11-03 ENCOUNTER — NON-APPOINTMENT (OUTPATIENT)
Age: 73
End: 2023-11-03

## 2023-11-03 VITALS
OXYGEN SATURATION: 96 % | HEIGHT: 63 IN | WEIGHT: 104 LBS | DIASTOLIC BLOOD PRESSURE: 79 MMHG | BODY MASS INDEX: 18.43 KG/M2 | SYSTOLIC BLOOD PRESSURE: 134 MMHG | HEART RATE: 76 BPM

## 2023-11-03 DIAGNOSIS — Z86.39 PERSONAL HISTORY OF OTHER ENDOCRINE, NUTRITIONAL AND METABOLIC DISEASE: ICD-10-CM

## 2023-11-03 PROCEDURE — 99205 OFFICE O/P NEW HI 60 MIN: CPT

## 2023-11-03 PROCEDURE — 93000 ELECTROCARDIOGRAM COMPLETE: CPT

## 2023-11-04 ENCOUNTER — APPOINTMENT (OUTPATIENT)
Dept: CARDIOLOGY | Facility: CLINIC | Age: 73
End: 2023-11-04
Payer: MEDICARE

## 2023-11-04 PROCEDURE — 93306 TTE W/DOPPLER COMPLETE: CPT

## 2023-11-13 ENCOUNTER — RESULT REVIEW (OUTPATIENT)
Age: 73
End: 2023-11-13

## 2023-11-13 ENCOUNTER — OUTPATIENT (OUTPATIENT)
Dept: OUTPATIENT SERVICES | Facility: HOSPITAL | Age: 73
LOS: 1 days | End: 2023-11-13
Payer: MEDICARE

## 2023-11-13 ENCOUNTER — APPOINTMENT (OUTPATIENT)
Dept: ULTRASOUND IMAGING | Facility: CLINIC | Age: 73
End: 2023-11-13
Payer: MEDICARE

## 2023-11-13 DIAGNOSIS — E78.5 HYPERLIPIDEMIA, UNSPECIFIED: ICD-10-CM

## 2023-11-13 DIAGNOSIS — Z86.39 PERSONAL HISTORY OF OTHER ENDOCRINE, NUTRITIONAL AND METABOLIC DISEASE: ICD-10-CM

## 2023-11-13 PROCEDURE — 93880 EXTRACRANIAL BILAT STUDY: CPT

## 2023-11-13 PROCEDURE — 93880 EXTRACRANIAL BILAT STUDY: CPT | Mod: 26

## 2023-11-27 ENCOUNTER — APPOINTMENT (OUTPATIENT)
Dept: CARDIOLOGY | Facility: CLINIC | Age: 73
End: 2023-11-27

## 2023-12-22 RX ORDER — ATORVASTATIN CALCIUM 40 MG/1
40 TABLET, FILM COATED ORAL
Qty: 90 | Refills: 3 | Status: DISCONTINUED | COMMUNITY
Start: 2023-11-03 | End: 2023-12-22

## 2024-01-17 PROBLEM — H53.2 DIPLOPIA: Status: ACTIVE | Noted: 2024-01-17

## 2024-01-31 ENCOUNTER — RX RENEWAL (OUTPATIENT)
Age: 74
End: 2024-01-31

## 2024-04-12 ENCOUNTER — RX RENEWAL (OUTPATIENT)
Age: 74
End: 2024-04-12

## 2024-04-12 RX ORDER — ATORVASTATIN CALCIUM 20 MG/1
20 TABLET, FILM COATED ORAL
Qty: 30 | Refills: 0 | Status: ACTIVE | COMMUNITY
Start: 2023-12-22 | End: 1900-01-01

## 2024-04-18 LAB
ALBUMIN SERPL ELPH-MCNC: 4.5 G/DL
ALP BLD-CCNC: 93 U/L
ALT SERPL-CCNC: 17 U/L
ANION GAP SERPL CALC-SCNC: 11 MMOL/L
AST SERPL-CCNC: 21 U/L
BILIRUB SERPL-MCNC: 0.4 MG/DL
BUN SERPL-MCNC: 16 MG/DL
CALCIUM SERPL-MCNC: 9.5 MG/DL
CHLORIDE SERPL-SCNC: 105 MMOL/L
CHOLEST SERPL-MCNC: 266 MG/DL
CO2 SERPL-SCNC: 28 MMOL/L
CREAT SERPL-MCNC: 0.67 MG/DL
EGFR: 92 ML/MIN/1.73M2
ESTIMATED AVERAGE GLUCOSE: 117 MG/DL
GLUCOSE SERPL-MCNC: 94 MG/DL
HBA1C MFR BLD HPLC: 5.7 %
HCT VFR BLD CALC: 44 %
HDLC SERPL-MCNC: 74 MG/DL
HGB BLD-MCNC: 14.1 G/DL
LDLC SERPL CALC-MCNC: 180 MG/DL
MCHC RBC-ENTMCNC: 27.8 PG
MCHC RBC-ENTMCNC: 32 GM/DL
MCV RBC AUTO: 86.6 FL
NONHDLC SERPL-MCNC: 192 MG/DL
PLATELET # BLD AUTO: 236 K/UL
POTASSIUM SERPL-SCNC: 4.4 MMOL/L
PROT SERPL-MCNC: 7.2 G/DL
RBC # BLD: 5.08 M/UL
RBC # FLD: 12.6 %
SODIUM SERPL-SCNC: 144 MMOL/L
TRIGL SERPL-MCNC: 78 MG/DL
TSH SERPL-ACNC: 2.38 UIU/ML
WBC # FLD AUTO: 6.05 K/UL

## 2024-04-26 ENCOUNTER — NON-APPOINTMENT (OUTPATIENT)
Age: 74
End: 2024-04-26

## 2024-04-29 ENCOUNTER — OFFICE (OUTPATIENT)
Dept: URBAN - METROPOLITAN AREA CLINIC 35 | Facility: CLINIC | Age: 74
Setting detail: OPHTHALMOLOGY
End: 2024-04-29
Payer: MEDICARE

## 2024-04-29 DIAGNOSIS — H25.12: ICD-10-CM

## 2024-04-29 PROCEDURE — 99213 OFFICE O/P EST LOW 20 MIN: CPT | Performed by: OPHTHALMOLOGY

## 2024-04-30 PROBLEM — E78.5 HYPERLIPIDEMIA: Status: ACTIVE | Noted: 2017-01-04

## 2024-05-01 ENCOUNTER — APPOINTMENT (OUTPATIENT)
Dept: CARDIOLOGY | Facility: CLINIC | Age: 74
End: 2024-05-01
Payer: MEDICARE

## 2024-05-01 ENCOUNTER — NON-APPOINTMENT (OUTPATIENT)
Age: 74
End: 2024-05-01

## 2024-05-01 VITALS
HEART RATE: 74 BPM | OXYGEN SATURATION: 97 % | SYSTOLIC BLOOD PRESSURE: 151 MMHG | BODY MASS INDEX: 18.6 KG/M2 | DIASTOLIC BLOOD PRESSURE: 79 MMHG | WEIGHT: 105 LBS

## 2024-05-01 DIAGNOSIS — I10 ESSENTIAL (PRIMARY) HYPERTENSION: ICD-10-CM

## 2024-05-01 DIAGNOSIS — E78.5 HYPERLIPIDEMIA, UNSPECIFIED: ICD-10-CM

## 2024-05-01 PROCEDURE — 93000 ELECTROCARDIOGRAM COMPLETE: CPT

## 2024-05-01 PROCEDURE — G2211 COMPLEX E/M VISIT ADD ON: CPT

## 2024-05-01 PROCEDURE — 99215 OFFICE O/P EST HI 40 MIN: CPT

## 2024-05-01 RX ORDER — EVOLOCUMAB 140 MG/ML
140 INJECTION, SOLUTION SUBCUTANEOUS
Qty: 1 | Refills: 6 | Status: ACTIVE | COMMUNITY
Start: 2024-05-01 | End: 1900-01-01

## 2024-05-22 ENCOUNTER — APPOINTMENT (OUTPATIENT)
Dept: CARDIOLOGY | Facility: CLINIC | Age: 74
End: 2024-05-22
Payer: MEDICARE

## 2024-05-22 PROCEDURE — 93880 EXTRACRANIAL BILAT STUDY: CPT

## 2024-05-22 PROCEDURE — 93306 TTE W/DOPPLER COMPLETE: CPT

## 2024-05-29 ENCOUNTER — APPOINTMENT (OUTPATIENT)
Dept: OTOLARYNGOLOGY | Facility: CLINIC | Age: 74
End: 2024-05-29
Payer: MEDICARE

## 2024-05-29 VITALS
OXYGEN SATURATION: 96 % | SYSTOLIC BLOOD PRESSURE: 166 MMHG | DIASTOLIC BLOOD PRESSURE: 97 MMHG | WEIGHT: 105 LBS | HEART RATE: 66 BPM | BODY MASS INDEX: 18.61 KG/M2 | HEIGHT: 63 IN

## 2024-05-29 DIAGNOSIS — R04.0 EPISTAXIS: ICD-10-CM

## 2024-05-29 PROCEDURE — 31231 NASAL ENDOSCOPY DX: CPT

## 2024-05-29 PROCEDURE — 99204 OFFICE O/P NEW MOD 45 MIN: CPT | Mod: 25

## 2024-05-29 NOTE — HISTORY OF PRESENT ILLNESS
[de-identified] : 73 year old female presents for recurrent epistaxis 1 week of daily left epistaxis, mainly in the morning, described as gushing- lasts about 15 minutes- resolves with holding pressure Most recent episode was yesterday Prior hx recurrent epistaxis, but none in the last few years  Denies prior cauterization  Intermittent nasal congestion, clear anterior rhinorrhea, PND- worse for the past week Denies facial pain/pressure, reduced sense of smell, recurrent sinus infections Taking Allegra as needed No use of nasal sprays/rinses  PMH: HTN, HLD

## 2024-07-01 ENCOUNTER — OFFICE (OUTPATIENT)
Dept: URBAN - METROPOLITAN AREA CLINIC 35 | Facility: CLINIC | Age: 74
Setting detail: OPHTHALMOLOGY
End: 2024-07-01
Payer: MEDICARE

## 2024-07-01 ENCOUNTER — RX ONLY (RX ONLY)
Age: 74
End: 2024-07-01

## 2024-07-01 DIAGNOSIS — H35.40: ICD-10-CM

## 2024-07-01 DIAGNOSIS — H43.393: ICD-10-CM

## 2024-07-01 DIAGNOSIS — H01.001: ICD-10-CM

## 2024-07-01 DIAGNOSIS — H25.12: ICD-10-CM

## 2024-07-01 DIAGNOSIS — H01.004: ICD-10-CM

## 2024-07-01 DIAGNOSIS — H02.31: ICD-10-CM

## 2024-07-01 DIAGNOSIS — H43.812: ICD-10-CM

## 2024-07-01 DIAGNOSIS — H35.033: ICD-10-CM

## 2024-07-01 DIAGNOSIS — Z96.1: ICD-10-CM

## 2024-07-01 DIAGNOSIS — H16.222: ICD-10-CM

## 2024-07-01 DIAGNOSIS — H11.153: ICD-10-CM

## 2024-07-01 DIAGNOSIS — H02.34: ICD-10-CM

## 2024-07-01 PROCEDURE — 99214 OFFICE O/P EST MOD 30 MIN: CPT | Performed by: OPHTHALMOLOGY

## 2024-07-01 ASSESSMENT — LID EXAM ASSESSMENTS
OS_BLEPHARITIS: LUL 1+
OD_BLEPHARITIS: RUL 1+
OD_COMMENTS: BLEPHAROCHALASIS WITH HOODING
OS_COMMENTS: BLEPHAROCHALASIS WITH HOODING

## 2024-07-01 ASSESSMENT — CONFRONTATIONAL VISUAL FIELD TEST (CVF)
OD_FINDINGS: FULL
OS_FINDINGS: FULL

## 2024-07-15 ENCOUNTER — DOCTOR'S OFFICE (OUTPATIENT)
Age: 74
Setting detail: OPHTHALMOLOGY
End: 2024-07-15
Payer: MEDICARE

## 2024-07-15 DIAGNOSIS — H35.40: ICD-10-CM

## 2024-07-15 DIAGNOSIS — H25.12: ICD-10-CM

## 2024-07-15 DIAGNOSIS — H40.013: ICD-10-CM

## 2024-07-15 DIAGNOSIS — H35.033: ICD-10-CM

## 2024-07-15 PROBLEM — H43.812 POSTERIOR VITREOUS DETACHMENT; LEFT EYE: Status: ACTIVE | Noted: 2024-07-01

## 2024-07-15 PROBLEM — H01.001 BLEPHARITIS; RIGHT UPPER LID, , LEFT UPPER LID: Status: ACTIVE | Noted: 2024-07-01

## 2024-07-15 PROBLEM — H01.004 BLEPHARITIS; RIGHT UPPER LID, , LEFT UPPER LID: Status: ACTIVE | Noted: 2024-07-01

## 2024-07-15 PROCEDURE — 99214 OFFICE O/P EST MOD 30 MIN: CPT | Performed by: OPHTHALMOLOGY

## 2024-07-15 ASSESSMENT — LID EXAM ASSESSMENTS
OD_BLEPHARITIS: RUL 1+
OS_BLEPHARITIS: LUL 1+
OD_COMMENTS: BLEPHAROCHALASIS WITH HOODING
OS_COMMENTS: BLEPHAROCHALASIS WITH HOODING

## 2024-07-15 ASSESSMENT — CONFRONTATIONAL VISUAL FIELD TEST (CVF)
OS_FINDINGS: FULL
OD_FINDINGS: FULL

## 2024-07-16 RX ORDER — ROSUVASTATIN CALCIUM 20 MG/1
20 TABLET, FILM COATED ORAL
Qty: 30 | Refills: 5 | Status: ACTIVE | COMMUNITY
Start: 2024-07-16 | End: 1900-01-01

## 2024-09-27 DIAGNOSIS — E78.5 HYPERLIPIDEMIA, UNSPECIFIED: ICD-10-CM

## 2024-10-21 ENCOUNTER — NON-APPOINTMENT (OUTPATIENT)
Age: 74
End: 2024-10-21

## 2024-10-21 ENCOUNTER — APPOINTMENT (OUTPATIENT)
Dept: CARDIOLOGY | Facility: CLINIC | Age: 74
End: 2024-10-21
Payer: MEDICARE

## 2024-10-21 VITALS
SYSTOLIC BLOOD PRESSURE: 120 MMHG | OXYGEN SATURATION: 96 % | WEIGHT: 108 LBS | HEART RATE: 76 BPM | BODY MASS INDEX: 19.13 KG/M2 | DIASTOLIC BLOOD PRESSURE: 74 MMHG

## 2024-10-21 DIAGNOSIS — E78.5 HYPERLIPIDEMIA, UNSPECIFIED: ICD-10-CM

## 2024-10-21 DIAGNOSIS — I10 ESSENTIAL (PRIMARY) HYPERTENSION: ICD-10-CM

## 2024-10-21 PROCEDURE — G2211 COMPLEX E/M VISIT ADD ON: CPT

## 2024-10-21 PROCEDURE — 93000 ELECTROCARDIOGRAM COMPLETE: CPT

## 2024-10-21 PROCEDURE — 99214 OFFICE O/P EST MOD 30 MIN: CPT

## 2025-04-08 ENCOUNTER — APPOINTMENT (OUTPATIENT)
Dept: CARDIOLOGY | Facility: CLINIC | Age: 75
End: 2025-04-08
Payer: MEDICARE

## 2025-04-08 ENCOUNTER — NON-APPOINTMENT (OUTPATIENT)
Age: 75
End: 2025-04-08

## 2025-04-08 VITALS
BODY MASS INDEX: 18.42 KG/M2 | DIASTOLIC BLOOD PRESSURE: 70 MMHG | HEART RATE: 74 BPM | OXYGEN SATURATION: 94 % | WEIGHT: 104 LBS | SYSTOLIC BLOOD PRESSURE: 110 MMHG

## 2025-04-08 DIAGNOSIS — Z00.00 ENCOUNTER FOR GENERAL ADULT MEDICAL EXAMINATION W/OUT ABNORMAL FINDINGS: ICD-10-CM

## 2025-04-08 DIAGNOSIS — I10 ESSENTIAL (PRIMARY) HYPERTENSION: ICD-10-CM

## 2025-04-08 DIAGNOSIS — Z86.39 PERSONAL HISTORY OF OTHER ENDOCRINE, NUTRITIONAL AND METABOLIC DISEASE: ICD-10-CM

## 2025-04-08 PROCEDURE — 93306 TTE W/DOPPLER COMPLETE: CPT

## 2025-04-08 PROCEDURE — G0537: CPT

## 2025-04-08 PROCEDURE — 99215 OFFICE O/P EST HI 40 MIN: CPT

## 2025-04-08 PROCEDURE — 93000 ELECTROCARDIOGRAM COMPLETE: CPT

## 2025-04-08 PROCEDURE — G2211 COMPLEX E/M VISIT ADD ON: CPT

## 2025-06-02 ENCOUNTER — DOCTOR'S OFFICE (OUTPATIENT)
Facility: LOCATION | Age: 75
Setting detail: OPHTHALMOLOGY
End: 2025-06-02
Payer: MEDICARE

## 2025-06-02 DIAGNOSIS — H40.013: ICD-10-CM

## 2025-06-02 DIAGNOSIS — H25.12: ICD-10-CM

## 2025-06-02 PROCEDURE — 99213 OFFICE O/P EST LOW 20 MIN: CPT | Performed by: OPHTHALMOLOGY

## 2025-06-02 PROCEDURE — 92136 OPHTHALMIC BIOMETRY: CPT | Mod: 26,LT | Performed by: OPHTHALMOLOGY

## 2025-06-02 ASSESSMENT — REFRACTION_MANIFEST
OS_SPHERE: +0.75
OS_AXIS: 170
OS_VA1: 20/30-
OD_CYLINDER: SPH
OS_ADD: +2.50
OD_SPHERE: -1.50
OD_VA1: 20/30-
OD_SPHERE: -0.25
OD_SPHERE: -0.25
OS_SPHERE: PLANO
OS_VA1: 20/25
OS_AXIS: 180
OS_AXIS: 170
OD_VA1: 20/30-2
OS_ADD: +1.50
OS_ADD: +2.50
OD_AXIS: 180
OS_SPHERE: +0.25
OS_CYLINDER: +1.00
OS_CYLINDER: +1.50
OD_VA1: 20/20
OD_ADD: +2.25
OS_AXIS: 180
OS_ADD: +2.50
OD_AXIS: 180
OD_CYLINDER: +1.25
OD_AXIS: 180
OS_CYLINDER: +1.50
OS_SPHERE: +0.50
OD_VA1: 20/25
OS_SPHERE: +0.50
OS_CYLINDER: +1.25
OD_CYLINDER: +1.25
OS_VA1: 20/20
OD_ADD: +2.50
OD_SPHERE: -1.75
OS_CYLINDER: +0.50
OS_AXIS: 170
OS_SPHERE: PLANO
OD_AXIS: 160
OS_CYLINDER: +1.50
OD_VA1: 20/20
OS_SPHERE: +0.75
OS_CYLINDER: +1.50
OS_AXIS: 180
OS_CYLINDER: +1.50
OD_AXIS: 180
OD_CYLINDER: +1.25
OD_ADD: +2.50
OD_ADD: +1.50
OD_CYLINDER: +1.00
OS_ADD: +2.25
OD_SPHERE: -1.50
OS_AXIS: 165
OS_VA1: 20/20-2
OS_SPHERE: +1.00
OS_AXIS: 175
OD_ADD: +2.50
OD_CYLINDER: SPH
OD_CYLINDER: +1.00
OD_SPHERE: -1.25
OD_SPHERE: -1.75

## 2025-06-02 ASSESSMENT — REFRACTION_CURRENTRX
OD_AXIS: 005
OD_ADD: +2.50
OD_CYLINDER: SPHERE
OS_SPHERE: +0.25
OD_CYLINDER: +1.25
OD_CYLINDER: +1.00
OD_SPHERE: +0.50
OD_OVR_VA: 20/
OD_SPHERE: -0.25
OD_SPHERE: -1.75
OS_ADD: +2.50
OS_SPHERE: -0.25
OD_VPRISM_DIRECTION: PROGS
OD_OVR_VA: 20/
OS_CYLINDER: +1.25
OD_AXIS: 0
OS_CYLINDER: +1.50
OD_AXIS: 002
OS_OVR_VA: 20/
OD_VPRISM_DIRECTION: SV
OD_OVR_VA: 20/
OS_AXIS: 169
OS_VPRISM_DIRECTION: PROGS
OS_SPHERE: +1.50
OS_CYLINDER: +1.50
OS_OVR_VA: 20/
OS_AXIS: 163
OS_AXIS: 018
OS_OVR_VA: 20/

## 2025-06-02 ASSESSMENT — KERATOMETRY
OS_K1POWER_DIOPTERS: 43.00
OS_AXISANGLE_DEGREES: 157
OD_AXISANGLE_DEGREES: 083
METHOD_AUTO_MANUAL: AUTO
OD_K2POWER_DIOPTERS: 42.75
OS_K2POWER_DIOPTERS: 43.50
OD_K1POWER_DIOPTERS: 42.25

## 2025-06-02 ASSESSMENT — CONFRONTATIONAL VISUAL FIELD TEST (CVF)
OD_FINDINGS: FULL
OS_FINDINGS: FULL

## 2025-06-02 ASSESSMENT — VISUAL ACUITY
OS_BCVA: 20/20
OD_BCVA: 20/25

## 2025-06-02 ASSESSMENT — REFRACTION_AUTOREFRACTION
OD_CYLINDER: +0.25
OD_SPHERE: -0.50
OD_AXIS: 113
OS_CYLINDER: +1.25
OS_AXIS: 180
OS_SPHERE: +1.00

## 2025-06-02 ASSESSMENT — TONOMETRY
OD_IOP_MMHG: 16
OS_IOP_MMHG: 18

## 2025-07-03 ENCOUNTER — RX RENEWAL (OUTPATIENT)
Age: 75
End: 2025-07-03

## 2025-07-17 ENCOUNTER — ASC (OUTPATIENT)
Dept: URBAN - METROPOLITAN AREA SURGERY 8 | Facility: SURGERY | Age: 75
Setting detail: OPHTHALMOLOGY
End: 2025-07-17
Payer: MEDICARE

## 2025-07-17 DIAGNOSIS — H25.12: ICD-10-CM

## 2025-07-17 DIAGNOSIS — H52.222: ICD-10-CM

## 2025-07-17 PROCEDURE — FEMTO PRECISION LASER CATARACT SURGERY: Mod: GY | Performed by: OPHTHALMOLOGY

## 2025-07-17 PROCEDURE — S9986 NOT MEDICALLY NECESSARY SVC: HCPCS | Mod: GX,GY | Performed by: OPHTHALMOLOGY

## 2025-07-17 PROCEDURE — V2787 ASTIGMATISM-CORRECT FUNCTION: HCPCS | Performed by: OPHTHALMOLOGY

## 2025-07-17 PROCEDURE — 66984 XCAPSL CTRC RMVL W/O ECP: CPT | Mod: LT | Performed by: OPHTHALMOLOGY

## 2025-07-18 ENCOUNTER — RX ONLY (RX ONLY)
Age: 75
End: 2025-07-18

## 2025-07-18 ENCOUNTER — DOCTOR'S OFFICE (OUTPATIENT)
Facility: LOCATION | Age: 75
Setting detail: OPHTHALMOLOGY
End: 2025-07-18
Payer: MEDICARE

## 2025-07-18 DIAGNOSIS — Z96.1: ICD-10-CM

## 2025-07-18 PROBLEM — H11.152: Status: ACTIVE | Noted: 2025-07-18

## 2025-07-18 PROCEDURE — 99024 POSTOP FOLLOW-UP VISIT: CPT | Performed by: OPHTHALMOLOGY

## 2025-07-18 ASSESSMENT — REFRACTION_MANIFEST
OS_ADD: +1.50
OD_SPHERE: -1.50
OD_CYLINDER: +1.25
OS_VA1: 20/20-2
OD_SPHERE: -0.25
OS_AXIS: 180
OD_CYLINDER: SPH
OS_AXIS: 170
OD_ADD: +2.50
OS_CYLINDER: +0.50
OD_AXIS: 180
OD_ADD: +1.50
OD_AXIS: 180
OD_VA1: 20/20
OS_CYLINDER: +1.50
OS_VA1: 20/20
OD_AXIS: 180
OD_SPHERE: -1.50
OS_SPHERE: +0.75
OS_SPHERE: PLANO
OS_AXIS: 180
OS_AXIS: 180
OD_CYLINDER: +1.00
OS_CYLINDER: +1.50
OS_ADD: +2.25
OD_AXIS: 180
OD_SPHERE: -0.25
OD_SPHERE: -1.75
OD_SPHERE: -1.25
OS_VA1: 20/25
OS_AXIS: 170
OS_AXIS: 165
OS_VA1: 20/30-
OD_CYLINDER: +1.25
OD_CYLINDER: +1.25
OD_ADD: +2.50
OD_ADD: +2.50
OS_SPHERE: +0.25
OD_VA1: 20/30-2
OS_SPHERE: +1.00
OD_AXIS: 160
OD_VA1: 20/20
OD_SPHERE: -1.75
OS_SPHERE: PLANO
OS_AXIS: 175
OS_SPHERE: +0.50
OS_CYLINDER: +1.50
OS_ADD: +2.50
OD_VA1: 20/25
OD_CYLINDER: SPH
OS_ADD: +2.50
OS_SPHERE: +0.50
OD_VA1: 20/30-
OS_ADD: +2.50
OS_AXIS: 170
OS_CYLINDER: +1.50
OS_CYLINDER: +1.25
OD_ADD: +2.25
OS_CYLINDER: +1.50
OS_CYLINDER: +1.00
OS_SPHERE: +0.75
OD_CYLINDER: +1.00

## 2025-07-18 ASSESSMENT — REFRACTION_CURRENTRX
OS_SPHERE: +1.50
OD_SPHERE: -0.25
OS_ADD: +2.50
OS_AXIS: 163
OD_VPRISM_DIRECTION: SV
OS_SPHERE: -0.25
OS_AXIS: 018
OS_CYLINDER: +1.50
OD_CYLINDER: +1.25
OD_AXIS: 0
OS_CYLINDER: +1.50
OD_CYLINDER: +1.00
OD_OVR_VA: 20/
OS_OVR_VA: 20/
OS_SPHERE: +0.25
OD_VPRISM_DIRECTION: PROGS
OD_AXIS: 005
OS_VPRISM_DIRECTION: PROGS
OS_AXIS: 169
OD_OVR_VA: 20/
OD_AXIS: 002
OD_CYLINDER: SPHERE
OD_OVR_VA: 20/
OS_CYLINDER: +1.25
OS_OVR_VA: 20/
OD_SPHERE: -1.75
OD_SPHERE: +0.50
OS_OVR_VA: 20/
OD_ADD: +2.50

## 2025-07-18 ASSESSMENT — TONOMETRY: OS_IOP_MMHG: 16

## 2025-07-18 ASSESSMENT — VISUAL ACUITY
OD_BCVA: 20/20-
OS_BCVA: 20/20

## 2025-07-18 ASSESSMENT — KERATOMETRY
OD_K2POWER_DIOPTERS: 43.00
OS_K2POWER_DIOPTERS: 43.00
OS_K1POWER_DIOPTERS: 42.50
METHOD_AUTO_MANUAL: AUTO
OS_AXISANGLE_DEGREES: 152
OD_AXISANGLE_DEGREES: 086
OD_K1POWER_DIOPTERS: 42.25

## 2025-07-18 ASSESSMENT — REFRACTION_AUTOREFRACTION
OS_AXIS: 066
OS_SPHERE: -0.75
OD_SPHERE: -0.75
OD_CYLINDER: +0.50
OD_AXIS: 113
OS_CYLINDER: +0.50

## 2025-07-18 ASSESSMENT — SUPERFICIAL PUNCTATE KERATITIS (SPK): OS_SPK: 1+

## 2025-08-19 ENCOUNTER — DOCTOR'S OFFICE (OUTPATIENT)
Facility: LOCATION | Age: 75
Setting detail: OPHTHALMOLOGY
End: 2025-08-19
Payer: MEDICARE

## 2025-08-19 DIAGNOSIS — Z96.1: ICD-10-CM

## 2025-08-19 PROBLEM — H11.153: Status: ACTIVE | Noted: 2025-08-19

## 2025-08-19 PROCEDURE — 99024 POSTOP FOLLOW-UP VISIT: CPT | Performed by: OPHTHALMOLOGY

## 2025-08-19 ASSESSMENT — REFRACTION_MANIFEST
OD_CYLINDER: +1.25
OD_CYLINDER: +1.25
OS_CYLINDER: +0.25
OS_SPHERE: +0.75
OD_AXIS: 180
OS_ADD: +2.50
OS_SPHERE: +0.50
OS_SPHERE: -0.25
OD_CYLINDER: +1.00
OS_VA1: 20/20
OS_SPHERE: +0.75
OS_CYLINDER: +1.50
OD_CYLINDER: +1.00
OS_AXIS: 180
OD_CYLINDER: +1.25
OS_SPHERE: +0.25
OS_VA1: 20/25
OD_ADD: +2.25
OD_AXIS: 180
OS_CYLINDER: +1.50
OS_AXIS: 180
OD_AXIS: 180
OS_CYLINDER: +1.50
OD_CYLINDER: SPH
OS_SPHERE: PLANO
OS_AXIS: 170
OS_CYLINDER: +1.50
OD_SPHERE: -0.25
OS_AXIS: 170
OD_VA1: 20/25
OS_SPHERE: +0.50
OS_SPHERE: PLANO
OS_AXIS: 047
OD_SPHERE: -1.25
OS_AXIS: 165
OS_AXIS: 170
OD_AXIS: 160
OD_CYLINDER: SPH
OD_SPHERE: -1.50
OD_VA1: 20/30-
OS_CYLINDER: +1.00
OD_ADD: +1.50
OD_VA1: 20/20
OD_ADD: +2.50
OD_SPHERE: -1.75
OD_VA1: 20/20
OD_SPHERE: -0.25
OD_ADD: +2.50
OS_CYLINDER: +0.25
OS_VA1: 20/20-
OS_ADD: +1.50
OS_SPHERE: +1.00
OD_ADD: +2.50
OS_CYLINDER: +1.25
OS_AXIS: 180
OD_SPHERE: -1.75
OS_CYLINDER: +0.50
OS_SPHERE: -0.25
OS_VA1: 20/20-
OS_AXIS: 047
OS_VA1: 20/30-
OS_ADD: +2.50
OS_ADD: +2.25
OD_AXIS: 180
OS_AXIS: 175
OS_VA1: 20/20-2
OS_CYLINDER: +1.50
OD_SPHERE: -1.50
OS_ADD: +2.50
OD_VA1: 20/30-2

## 2025-08-19 ASSESSMENT — KERATOMETRY
OS_AXISANGLE_DEGREES: 160
OS_K1POWER_DIOPTERS: 43.00
OS_K2POWER_DIOPTERS: 43.50
METHOD_AUTO_MANUAL: AUTO
OD_K2POWER_DIOPTERS: 42.75
OD_K1POWER_DIOPTERS: 42.25
OD_AXISANGLE_DEGREES: 085

## 2025-08-19 ASSESSMENT — TONOMETRY
OS_IOP_MMHG: 12
OD_IOP_MMHG: 14

## 2025-08-19 ASSESSMENT — REFRACTION_CURRENTRX
OS_AXIS: 018
OS_CYLINDER: +1.50
OD_OVR_VA: 20/
OS_ADD: +2.50
OD_CYLINDER: SPHERE
OS_OVR_VA: 20/
OS_AXIS: 163
OD_AXIS: 0
OD_CYLINDER: +1.25
OS_VPRISM_DIRECTION: PROGS
OS_OVR_VA: 20/
OD_AXIS: 002
OD_SPHERE: -0.25
OS_SPHERE: +0.25
OS_SPHERE: +1.50
OS_CYLINDER: +1.25
OD_VPRISM_DIRECTION: PROGS
OD_AXIS: 005
OD_SPHERE: -1.75
OD_OVR_VA: 20/
OD_SPHERE: +0.50
OD_ADD: +2.50
OS_AXIS: 169
OD_CYLINDER: +1.00
OD_VPRISM_DIRECTION: SV
OS_SPHERE: -0.25
OS_CYLINDER: +1.50
OS_OVR_VA: 20/
OD_OVR_VA: 20/

## 2025-08-19 ASSESSMENT — CONFRONTATIONAL VISUAL FIELD TEST (CVF)
OD_FINDINGS: FULL
OS_FINDINGS: FULL

## 2025-08-19 ASSESSMENT — REFRACTION_AUTOREFRACTION
OD_AXIS: 118
OD_CYLINDER: +0.50
OS_SPHERE: -0.50
OS_CYLINDER: +0.25
OS_AXIS: 047
OD_SPHERE: -0.75

## 2025-08-19 ASSESSMENT — VISUAL ACUITY
OD_BCVA: 20/25-2+2
OS_BCVA: 20/20

## 2025-08-19 ASSESSMENT — SUPERFICIAL PUNCTATE KERATITIS (SPK): OS_SPK: 1+

## 2025-08-19 ASSESSMENT — DRY EYES - PHYSICIAN NOTES: OS_GENERALCOMMENTS: IN LIMBUS
